# Patient Record
Sex: FEMALE | Race: WHITE | NOT HISPANIC OR LATINO | Employment: FULL TIME | URBAN - METROPOLITAN AREA
[De-identification: names, ages, dates, MRNs, and addresses within clinical notes are randomized per-mention and may not be internally consistent; named-entity substitution may affect disease eponyms.]

---

## 2017-03-01 ENCOUNTER — OFFICE VISIT - HEALTHEAST (OUTPATIENT)
Dept: FAMILY MEDICINE | Facility: CLINIC | Age: 47
End: 2017-03-01

## 2017-03-01 DIAGNOSIS — Z00.00 PREVENTATIVE HEALTH CARE: ICD-10-CM

## 2017-03-01 DIAGNOSIS — R10.9 RIGHT SIDED ABDOMINAL PAIN: ICD-10-CM

## 2017-03-01 DIAGNOSIS — Z13.29 SCREENING FOR THYROID DISORDER: ICD-10-CM

## 2017-03-01 DIAGNOSIS — F32.81 PMDD (PREMENSTRUAL DYSPHORIC DISORDER): ICD-10-CM

## 2017-03-01 DIAGNOSIS — Z13.0 SCREENING, ANEMIA, DEFICIENCY, IRON: ICD-10-CM

## 2017-03-01 DIAGNOSIS — Z13.21 SCREENING FOR ENDOCRINE, NUTRITIONAL, METABOLIC AND IMMUNITY DISORDER: ICD-10-CM

## 2017-03-01 DIAGNOSIS — G89.29 CHRONIC PAIN OF RIGHT KNEE: ICD-10-CM

## 2017-03-01 DIAGNOSIS — Z13.228 SCREENING FOR ENDOCRINE, NUTRITIONAL, METABOLIC AND IMMUNITY DISORDER: ICD-10-CM

## 2017-03-01 DIAGNOSIS — Z13.0 SCREENING FOR ENDOCRINE, NUTRITIONAL, METABOLIC AND IMMUNITY DISORDER: ICD-10-CM

## 2017-03-01 DIAGNOSIS — M25.561 CHRONIC PAIN OF RIGHT KNEE: ICD-10-CM

## 2017-03-01 DIAGNOSIS — Z13.228 SCREENING FOR METABOLIC DISORDER: ICD-10-CM

## 2017-03-01 DIAGNOSIS — Z00.00 ROUTINE GENERAL MEDICAL EXAMINATION AT A HEALTH CARE FACILITY: ICD-10-CM

## 2017-03-01 DIAGNOSIS — Z13.1 SCREENING FOR DIABETES MELLITUS: ICD-10-CM

## 2017-03-01 DIAGNOSIS — R06.02 SOB (SHORTNESS OF BREATH): ICD-10-CM

## 2017-03-01 DIAGNOSIS — N39.41 URGE INCONTINENCE: ICD-10-CM

## 2017-03-01 DIAGNOSIS — Z13.29 SCREENING FOR ENDOCRINE, NUTRITIONAL, METABOLIC AND IMMUNITY DISORDER: ICD-10-CM

## 2017-03-01 DIAGNOSIS — Z13.220 SCREENING, LIPID: ICD-10-CM

## 2017-03-01 LAB
CHOLEST SERPL-MCNC: 182 MG/DL
FASTING STATUS PATIENT QL REPORTED: YES
HBA1C MFR BLD: 5.5 % (ref 3.5–6)
HDLC SERPL-MCNC: 41 MG/DL
LDLC SERPL CALC-MCNC: 121 MG/DL
TRIGL SERPL-MCNC: 101 MG/DL

## 2017-03-01 ASSESSMENT — MIFFLIN-ST. JEOR: SCORE: 2040.84

## 2017-03-01 NOTE — ASSESSMENT & PLAN NOTE
Currently resolved, but noted two weeks ago - she is worried it was her gallbladder, will get LFTs.

## 2017-03-01 NOTE — ASSESSMENT & PLAN NOTE
Weight reduction recommended.  She  is participating in the Weight Loss Program at [facility].

## 2017-03-01 NOTE — ASSESSMENT & PLAN NOTE
Pap: 7/30/2012. Results were: normal hpv neg.  Mammo: November 2016. Results were: normal  Colonoscopy:  There is no family or personal history, not indicated    Std testing desired:  offered  Osteoporosis prevention discussed.  vitamin d levels ordered. Recommend daily calcium and vitamin d intake to keep good bone health. Recommend weight bearing exercise, no tobacco, and limit alcohol  dexa - recommend after menopause.   Recommend sunscreen, exercise, & healthy diet.  Offered tsh, glucose, hgb, lipid  I have had an Advance Directives discussion with the patient.   Body mass index is 53.57 kg/(m^2).   mychart offered.

## 2017-03-01 NOTE — ASSESSMENT & PLAN NOTE
She also has chronic shortness of breath that she associates with her morbid obesity which is stable and not worsening.   Body mass index is 53.57 kg/(m^2).

## 2017-03-01 NOTE — ASSESSMENT & PLAN NOTE
Weight reduction recommended.  She  is participating in the Weight Loss Program at [facility].

## 2017-03-02 ENCOUNTER — COMMUNICATION - HEALTHEAST (OUTPATIENT)
Dept: FAMILY MEDICINE | Facility: CLINIC | Age: 47
End: 2017-03-02

## 2017-03-02 DIAGNOSIS — D64.9 BORDERLINE ANEMIA: ICD-10-CM

## 2017-03-02 DIAGNOSIS — E55.9 VITAMIN D DEFICIENCY DISEASE: ICD-10-CM

## 2017-03-21 ENCOUNTER — COMMUNICATION - HEALTHEAST (OUTPATIENT)
Dept: SCHEDULING | Facility: CLINIC | Age: 47
End: 2017-03-21

## 2017-03-22 ENCOUNTER — OFFICE VISIT - HEALTHEAST (OUTPATIENT)
Dept: FAMILY MEDICINE | Facility: CLINIC | Age: 47
End: 2017-03-22

## 2017-03-22 DIAGNOSIS — N39.0 UTI (URINARY TRACT INFECTION): ICD-10-CM

## 2017-03-22 DIAGNOSIS — R10.9 ABDOMINAL PAIN: ICD-10-CM

## 2017-03-22 RX ORDER — MULTIVITAMIN
1 CAPSULE ORAL DAILY
Status: SHIPPED | COMMUNITY
Start: 2012-07-30

## 2017-03-26 ENCOUNTER — COMMUNICATION - HEALTHEAST (OUTPATIENT)
Dept: FAMILY MEDICINE | Facility: CLINIC | Age: 47
End: 2017-03-26

## 2021-05-29 ENCOUNTER — RECORDS - HEALTHEAST (OUTPATIENT)
Dept: ADMINISTRATIVE | Facility: CLINIC | Age: 51
End: 2021-05-29

## 2021-05-30 ENCOUNTER — RECORDS - HEALTHEAST (OUTPATIENT)
Dept: ADMINISTRATIVE | Facility: CLINIC | Age: 51
End: 2021-05-30

## 2021-05-30 VITALS — BODY MASS INDEX: 54.52 KG/M2 | WEIGHT: 293 LBS

## 2021-05-30 VITALS — HEIGHT: 65 IN | BODY MASS INDEX: 48.82 KG/M2 | WEIGHT: 293 LBS

## 2021-05-31 ENCOUNTER — RECORDS - HEALTHEAST (OUTPATIENT)
Dept: ADMINISTRATIVE | Facility: CLINIC | Age: 51
End: 2021-05-31

## 2021-06-01 ENCOUNTER — RECORDS - HEALTHEAST (OUTPATIENT)
Dept: ADMINISTRATIVE | Facility: CLINIC | Age: 51
End: 2021-06-01

## 2021-06-09 NOTE — PROGRESS NOTES
Assessment:      Healthy female exam.    over 15 min spent discussing topics besides usual annual exam.     Plan:      Problem List Items Addressed This Visit     Preventative health care     Pap: 7/30/2012. Results were: normal hpv neg.  Mammo: November 2016. Results were: normal  Colonoscopy:  There is no family or personal history, not indicated    Std testing desired:  offered  Osteoporosis prevention discussed.  vitamin d levels ordered. Recommend daily calcium and vitamin d intake to keep good bone health. Recommend weight bearing exercise, no tobacco, and limit alcohol  dexa - recommend after menopause.   Recommend sunscreen, exercise, & healthy diet.  Offered tsh, glucose, hgb, lipid  I have had an Advance Directives discussion with the patient.   Body mass index is 53.57 kg/(m^2).   mychart offered.           Right knee pain     Weight reduction recommended.  She  is participating in the Weight Loss Program at [facility].           Urge incontinence     Weight reduction recommended.  She  is participating in the Weight Loss Program at [facility].           PMDD (premenstrual dysphoric disorder)     Will try sarafem. Recheck in 2-4 months to ensure working well.          Relevant Medications    FLUoxetine (PROZAC) 10 MG capsule    Right sided abdominal pain     Currently resolved, but noted two weeks ago - she is worried it was her gallbladder, will get LFTs.           SOB (shortness of breath)     She also has chronic shortness of breath that she associates with her morbid obesity which is stable and not worsening.   Body mass index is 53.57 kg/(m^2).            Other Visit Diagnoses     Routine general medical examination at a health care facility    -  Primary    Screening for thyroid disorder        Relevant Orders    Thyroid [place]    Screening for metabolic disorder        Relevant Orders    Comprehensive Metabolic Panel    Screening, lipid        Relevant Orders    Lipid Cascade     Screening for diabetes mellitus        Relevant Orders    Glycosylated Hemoglobin A1c    Screening, anemia, deficiency, iron        Relevant Orders    HM1(CBC and Differential)    HM1 (CBC with Diff)    Screening for endocrine, nutritional, metabolic and immunity disorder        Relevant Orders    Vitamin D, Total (25-Hydroxy)           I have had an Advance Directives discussion with the patient.     The following are part of a depression follow up plan for the patient:  implementation of measures to provide psychological support      Subjective:      Khalida Lei is a 46 y.o. female who presents for an annual exam. The patient is sexually active. The patient participates in regular exercise: no. The patient reports that there is not domestic violence in her life.     Khalida Lei is a 46 y.o. female is interested in the weight loss program. She is not interested in bariatric surgery.     She wants to mention some right side abdominal pain that she had two weeks ago that seems to be totally gone now.  She is worried about her gallbladder.     She also complains of chronic right knee pain.  She cannot exercise because the pain is too bothersome.     She also has chronic shortness of breath that she associates with her morbid obesity which is stable and not worsening.     Healthy Habits:   Regular Exercise: No  Sunscreen Use: Yes  Healthy Diet: Yes  Dental Visits Regularly: Yes  Seat Belt: Yes  Sexually active: Yes  Self Breast Exam Monthly:Yes  Hemoccults: No  Flex Sig: No  Colonoscopy: No  Lipid Profile: Yes  Glucose Screen: Yes  Prevention of Osteoporosis: Yes  Last Dexa: No  Guns at Home:  No      Immunization History   Administered Date(s) Administered     DT (pediatric) 01/01/1997     Influenza L1b3-89, 09/01/2009     Influenza, inj, historic 09/01/2009     Td, historic 04/07/2007     Tdap 05/04/2007     Immunization status: up to date and documented.    No exam data present    Gynecologic  "History  Patient's last menstrual period was 2017 (exact date).  Contraception: none  Last Pap: 2012. Results were: normal hpv neg.  Last mammogram: 2016. Results were: normal      OB History    Para Term  AB SAB TAB Ectopic Multiple Living   2 2 2   0    2      # Outcome Date GA Lbr Brian/2nd Weight Sex Delivery Anes PTL Lv   2 Term 06    F       1 Term 02    M              No current outpatient prescriptions on file.     No current facility-administered medications for this visit.      No past medical history on file.  No past surgical history on file.  Codeine and Scallops  No family history on file.  Social History     Social History     Marital status:      Spouse name: N/A     Number of children: N/A     Years of education: N/A     Occupational History     Not on file.     Social History Main Topics     Smoking status: Never Smoker     Smokeless tobacco: Never Used     Alcohol use Yes      Comment: Occasionally     Drug use: No     Sexual activity: Yes     Partners: Male     Other Topics Concern     Not on file     Social History Narrative     No narrative on file       Review of Systems see hpi.  Review of Systems   Constitutional: Negative.    HENT: Negative.    Eyes: Negative.    Respiratory: Negative.    Cardiovascular: Negative.    Gastrointestinal: Negative.    Endocrine: Negative.    Genitourinary: Negative.    Musculoskeletal: Negative.    Skin: Negative.    Neurological: Negative.    Hematological: Negative.    Psychiatric/Behavioral: Negative.              Objective:         Vitals:    17 0957   BP: 126/82   Pulse: 76   Resp: 16   Weight: (!) 317 lb (143.8 kg)   Height: 5' 4.5\" (1.638 m)     Body mass index is 53.57 kg/(m^2).    Physical   Physical Exam   Constitutional: She appears well-developed and well-nourished.   HENT:   Right Ear: External ear normal.   Left Ear: External ear normal.   Nose: Nose normal.   Mouth/Throat: Oropharynx is " clear and moist.   Eyes: Conjunctivae and EOM are normal. Pupils are equal, round, and reactive to light. Right eye exhibits no discharge. Left eye exhibits no discharge.   Neck: No thyromegaly present.   Cardiovascular: Normal rate, regular rhythm and normal heart sounds.    No murmur heard.  Pulmonary/Chest: Effort normal and breath sounds normal. Right breast exhibits no inverted nipple, no mass, no nipple discharge, no skin change and no tenderness. Left breast exhibits no inverted nipple, no mass, no nipple discharge, no skin change and no tenderness. Breasts are symmetrical.   Abdominal: Soft. Bowel sounds are normal. She exhibits no distension and no mass. There is no tenderness. There is no rebound and no guarding.   Genitourinary:   Genitourinary Comments: Declined pap up to date.   Musculoskeletal: Normal range of motion.   No joint swelling or deformity.   Lymphadenopathy:     She has no cervical adenopathy.   Neurological: She is alert. She has normal reflexes.   Skin: Skin is warm and dry. No rash noted.   Psychiatric: She has a normal mood and affect.   Little interest or pleasure in doing things: Not at all  Feeling down, depressed, or hopeless: Not at all  Trouble falling or staying asleep, or sleeping too much: Several days  Feeling tired or having little energy: Several days  Poor appetite or overeating: Not at all  Feeling bad about yourself - or that you are a failure or have let yourself or your family down: Not at all  Trouble concentrating on things, such as reading the newspaper or watching television: Not at all  Moving or speaking so slowly that other people could have noticed. Or the opposite - being so fidgety or restless that you have been moving around a lot more than usual: Not at all  Thoughts that you would be better off dead, or of hurting yourself in some way: Not at all  PHQ-9 Total Score: 2

## 2021-06-15 PROBLEM — R10.9 RIGHT SIDED ABDOMINAL PAIN: Status: ACTIVE | Noted: 2017-03-01

## 2021-06-15 PROBLEM — N39.41 URGE INCONTINENCE: Status: ACTIVE | Noted: 2017-03-01

## 2021-06-15 PROBLEM — D64.9 BORDERLINE ANEMIA: Status: ACTIVE | Noted: 2017-03-02

## 2021-06-15 PROBLEM — R06.02 SOB (SHORTNESS OF BREATH): Status: ACTIVE | Noted: 2017-03-01

## 2021-06-15 PROBLEM — E55.9 VITAMIN D DEFICIENCY DISEASE: Status: ACTIVE | Noted: 2017-03-02

## 2021-06-15 PROBLEM — F32.81 PMDD (PREMENSTRUAL DYSPHORIC DISORDER): Status: ACTIVE | Noted: 2017-03-01

## 2021-06-15 PROBLEM — M25.561 RIGHT KNEE PAIN: Status: ACTIVE | Noted: 2017-03-01

## 2021-06-15 PROBLEM — Z00.00 PREVENTATIVE HEALTH CARE: Status: ACTIVE | Noted: 2017-03-01

## 2021-06-17 ENCOUNTER — RECORDS - HEALTHEAST (OUTPATIENT)
Dept: ADMINISTRATIVE | Facility: OTHER | Age: 51
End: 2021-06-17

## 2021-06-25 NOTE — PROGRESS NOTES
Progress Notes by Dale Banks DO at 3/22/2017  2:50 PM     Author: Dale Banks DO Service: -- Author Type: Physician    Filed: 3/23/2017 11:44 AM Encounter Date: 3/22/2017 Status: Signed    : Dale Banks DO (Physician)       Chief Complaint   Patient presents with   ? Abdominal Pain     cramping, diarrhea. started yesterday pm. Seen by PCP when she didn't have Sx, so returned now with Sx. Spoke with triage.         History of Present Illness: Nursing notes reviewed. About 1 month ago, she had pain across her lower abdomen, worse on right side lasting about 9 days being on/off during that time. The pain would wake her up at night. She has had her appendix removed. She would have intermittent diarrhea during this time. She passed a mucous during one bowel movement. The symptoms recurred two nights ago, lasting about 2 hours. She had some chills yesterday. She had one episode of diarrhea yesterday morning.    Review of systems: See history of present illness, otherwise negative.     Current Outpatient Prescriptions   Medication Sig Dispense Refill   ? multivitamin capsule Take 1 capsule by mouth.     ? ciprofloxacin HCl (CIPRO) 500 MG tablet Take 1 tablet (500 mg total) by mouth 2 (two) times a day for 7 days. 14 tablet 0     No current facility-administered medications for this visit.        No past medical history on file.   No past surgical history on file.   Social History     Social History   ? Marital status:      Spouse name: N/A   ? Number of children: N/A   ? Years of education: N/A     Social History Main Topics   ? Smoking status: Never Smoker   ? Smokeless tobacco: Never Used   ? Alcohol use Yes      Comment: Occasionally   ? Drug use: No   ? Sexual activity: Yes     Partners: Male     Other Topics Concern   ? None     Social History Narrative       History   Smoking Status   ? Never Smoker   Smokeless Tobacco   ? Never Used      Exam:   Blood pressure 136/80, pulse 77, temperature  98.2  F (36.8  C), temperature source Oral, resp. rate 20, weight (!) 322 lb 9.6 oz (146.3 kg), last menstrual period 02/20/2017, SpO2 97 %, not currently breastfeeding.    EXAM:   General: Vital signs reviewed. Patient is in no acute appearing distress with no guarding of movement during exam. Breathing is non labored appearing. Patient is alert and oriented x 3.   Heart: Normal rate and rhythm without murmur  Lungs: Clear to auscultation with good air flow bilaterally.  Back: non tender  Abdomen soft, tender throughout the lower abdomen, more over the RLQ, no abnormal masses. Normal bowel sounds. No abdominal pain with right heel taping.  Skin: warm and dry    Recent Results (from the past 24 hour(s))   Comprehensive Metabolic Panel   Result Value Ref Range    Sodium 141 136 - 145 mmol/L    Potassium 3.8 3.5 - 5.0 mmol/L    Chloride 105 98 - 107 mmol/L    CO2 24 22 - 31 mmol/L    Anion Gap, Calculation 12 5 - 18 mmol/L    Glucose 109 70 - 125 mg/dL    BUN 15 8 - 22 mg/dL    Creatinine 0.87 0.60 - 1.10 mg/dL    GFR MDRD Af Amer >60 >60 mL/min/1.73m2    GFR MDRD Non Af Amer >60 >60 mL/min/1.73m2    Bilirubin, Total 0.3 0.0 - 1.0 mg/dL    Calcium 8.8 8.5 - 10.5 mg/dL    Protein, Total 7.6 6.0 - 8.0 g/dL    Albumin 3.2 (L) 3.5 - 5.0 g/dL    Alkaline Phosphatase 70 45 - 120 U/L    AST 20 0 - 40 U/L    ALT 27 0 - 45 U/L   Urinalysis   Result Value Ref Range    Color, UA Yellow Colorless, Yellow, Straw, Light Yellow    Clarity, UA Clear Clear    Glucose, UA Negative Negative    Bilirubin, UA Negative Negative    Ketones, UA Negative Negative    Specific Gravity, UA >=1.030 1.005 - 1.030    Blood, UA Negative Negative    pH, UA 5.5 5.0 - 8.0    Protein,  mg/dL (!) Negative mg/dL    Urobilinogen, UA 0.2 E.U./dL 0.2 E.U./dL, 1.0 E.U./dL    Nitrite, UA Negative Negative    Leukocytes, UA Small (!) Negative    Bacteria, UA Moderate (!) None Seen hpf    RBC, UA 0-2 None Seen, 0-2 hpf    WBC, UA 10-25 (!) None Seen, 0-5  hpf    Squam Epithel, UA  (!) None Seen, 0-5 lpf    Mucus, UA Moderate (!) None Seen lpf   HM1 (CBC with Diff)   Result Value Ref Range    WBC 11.6 (H) 4.0 - 11.0 thou/uL    RBC 4.60 3.80 - 5.40 mill/uL    Hemoglobin 11.5 (L) 12.0 - 16.0 g/dL    Hematocrit 35.4 35.0 - 47.0 %    MCV 77 (L) 80 - 100 fL    MCH 24.9 (L) 27.0 - 34.0 pg    MCHC 32.4 32.0 - 36.0 g/dL    RDW 15.0 (H) 11.0 - 14.5 %    Platelets 218 140 - 440 thou/uL    MPV 7.7 7.0 - 10.0 fL    Neutrophils % 70 50 - 70 %    Lymphocytes % 21 20 - 40 %    Monocytes % 6 2 - 10 %    Eosinophils % 3 0 - 6 %    Basophils % 1 0 - 2 %    Neutrophils Absolute 8.1 (H) 2.0 - 7.7 thou/uL    Lymphocytes Absolute 2.4 0.8 - 4.4 thou/uL    Monocytes Absolute 0.7 0.0 - 0.9 thou/uL    Eosinophils Absolute 0.4 0.0 - 0.4 thou/uL    Basophils Absolute 0.1 0.0 - 0.2 thou/uL   CBC and UA results from exam reviewed with patient during clinic visit.    Assessment/Plan   1. Abdominal pain  HM1(CBC and Differential)    Comprehensive Metabolic Panel    Urinalysis    HM1 (CBC with Diff)   2. UTI (urinary tract infection)  ciprofloxacin HCl (CIPRO) 500 MG tablet       Patient Instructions     Be seen again in 2 days if symptoms are not better, sooner if feeling any worse. Based on exam, your symptoms are still suggestive of diverticulitis. It is possible you could have both a urine infection, and diverticultis. We will notify you of the urine culture results when available. If you are feeling better, finish the antibiotic treatment even if the culture is negative. We will let you know the results of the pending blood tests when they are finished.    Urinary Tract Infections in Women  Urinary tract infections (UTIs) are most often caused by bacteria (germs). These bacteria enter the urinary tract. The bacteria may come from outside the body. Or they may travel from the skin outside the rectum or vagina into the urethra. Female anatomy makes it easier for bacteria from the bowel to  enter a womans urinary tract, which is the most common source of UTI. This means women develop UTIs more often than men. Pain in or around the urinary tract is a common UTI symptom. But the only way to know for sure if you have a UTI for the health care provider to test your urine. The two tests that may be done are the urinalysis and urine culture.  Types of UTIs    Cystitis: A bladder infection (cystitis) is the most common UTI in women. You may have urgent or frequent urination. You may also have pain, burning when you urinate, and bloody urine.    Urethritis: This is an inflamed urethra, which is the tube that carries urine from the bladder to outside the body. You may have lower stomach or back pain. You may also have urgent or frequent urination.    Pyelonephritis: This is a kidney infection. If not treated, it can be serious and damage your kidneys. In severe cases, you may be hospitalized. You may have a fever and lower back pain.  Medications to treat a UTI  Most UTIs are treated with antibiotics. These kill the bacteria. The length of time you need to take them depends on the type of infection. It may be as short as 3 days. If you have repeated UTIs, a low-dose antibiotic may be needed for several months. Take antibiotics exactly as directed. Dont stop taking them until all of the medication is gone. If you stop taking the antibiotic too soon, the infection may not go away, and you may develop a resistance to the antibiotic. This can make it much harder to treat.  Lifestyle changes to treat and prevent UTIs  The lifestyle changes below will help get rid of your UTI. They may also help prevent future UTIs.    Drink plenty of fluids. This includes water, juice, or other caffeine-free drinks. Fluids help flush bacteria out of your body.    Empty your bladder. Always empty your bladder when you feel the urge to urinate. And always urinate before going to sleep. Urine that stays in your bladder can lead to  infection. Try to urinate before and after sex as well.    Practice good personal hygiene. Wipe yourself from front to back after using the toilet. This helps keep bacteria from getting into the urethra.    Use condoms during sex. These help prevent UTIs caused by sexually transmitted bacteria. Also, avoid using spermicides during sex. These can increase the risk of UTIs. Choose other forms of birth control instead. For women who tend to get UTIs after sex, a low-dose of a preventive antibiotic may be used. Be sure to discuss this option with your health care provider.    Follow up with your health care provider as directed. He or she may test to make sure the infection has cleared. If necessary, additional treatment may be started.    4898-4574 [employer]. [address] [place] [address], [place] [address]. All rights reserved. This information is not intended as a substitute for professional medical care. Always follow your healthcare professional's instructions.          Understanding Diverticulosis and Diverticulitis              The colon (large intestine) is the last part of the digestive tract. It absorbs water from stool and changes it from a liquid to a solid. In certain cases, small pouches called diverticula can form in the colon wall. This condition is called diverticulosis. The pouches can become infected. If this happens, it becomes a more serious problem called diverticulitis. These problems can be painful. But they can be managed.  Managing Your Condition  Diet changes or taking medications are often tried first. These may be enough to bring relief. If the case is bad, surgery may be done. You and your doctor can discuss the plan that is best for you.  If You Have Diverticulosis    Diet changes are often enough to control symptoms. The main changes are adding fiber (roughage) and drinking more water. Fiber absorbs water as it travels through your colon. This helps your stool stay soft and move  smoothly. Water helps this process.    If needed, you may be told to take over-the-counter stool softeners.    To help relieve pain, antispasmodic medications may be prescribed.    Watch for changes in your bowel movements. Tell the doctor if you notice any changes.    Begin an exercise program. Ask your doctor how to get started.    Get plenty of rest and sleep.   If You Have Diverticulitis  Treatment depends on how bad your symptoms are.    For mild symptoms. You may be put on a liquid diet for a short time. Antibiotics are usually prescribed. If these 2 steps relieve your symptoms, you may then be prescribed a high-fiber diet. If you still have symptoms, your doctor will discuss further treatment options with you.    For severe symptoms. You may need to be admitted to the hospital. There, you can be given IV antibiotics and fluids. Once symptoms are under control, the above treatments may be tried. If these dont control your condition, your doctor may discuss the option of having surgery with you.  [place] [facility]  Help keep your colon healthy with a diet that includes plenty of high-fiber fruits, vegetables, and whole grains. Drink plenty of liquids like water and juice.     [id number] [employer]. [address] [place] [address], [place] [address]. All rights reserved. This information is not intended as a substitute for professional medical care. Always follow your healthcare professional's instructions.           Dale Banks,

## 2021-07-04 DIAGNOSIS — Z11.59 ENCOUNTER FOR SCREENING FOR OTHER VIRAL DISEASES: ICD-10-CM

## 2021-07-13 ENCOUNTER — RECORDS - HEALTHEAST (OUTPATIENT)
Dept: ADMINISTRATIVE | Facility: CLINIC | Age: 51
End: 2021-07-13

## 2021-07-21 ENCOUNTER — RECORDS - HEALTHEAST (OUTPATIENT)
Dept: ADMINISTRATIVE | Facility: CLINIC | Age: 51
End: 2021-07-21

## 2021-09-17 ENCOUNTER — LAB (OUTPATIENT)
Dept: LAB | Facility: CLINIC | Age: 51
End: 2021-09-17
Attending: INTERNAL MEDICINE
Payer: COMMERCIAL

## 2021-09-17 DIAGNOSIS — Z11.59 ENCOUNTER FOR SCREENING FOR OTHER VIRAL DISEASES: ICD-10-CM

## 2021-09-17 PROCEDURE — U0005 INFEC AGEN DETEC AMPLI PROBE: HCPCS

## 2021-09-17 PROCEDURE — U0003 INFECTIOUS AGENT DETECTION BY NUCLEIC ACID (DNA OR RNA); SEVERE ACUTE RESPIRATORY SYNDROME CORONAVIRUS 2 (SARS-COV-2) (CORONAVIRUS DISEASE [COVID-19]), AMPLIFIED PROBE TECHNIQUE, MAKING USE OF HIGH THROUGHPUT TECHNOLOGIES AS DESCRIBED BY CMS-2020-01-R: HCPCS

## 2021-09-18 LAB — SARS-COV-2 RNA RESP QL NAA+PROBE: NEGATIVE

## 2021-09-20 ENCOUNTER — HOSPITAL ENCOUNTER (OUTPATIENT)
Facility: CLINIC | Age: 51
Discharge: HOME | End: 2021-09-20
Attending: INTERNAL MEDICINE | Admitting: INTERNAL MEDICINE
Payer: COMMERCIAL

## 2021-09-20 ENCOUNTER — ANESTHESIA (OUTPATIENT)
Dept: SURGERY | Facility: CLINIC | Age: 51
End: 2021-09-20
Payer: COMMERCIAL

## 2021-09-20 ENCOUNTER — ANESTHESIA EVENT (OUTPATIENT)
Dept: SURGERY | Facility: CLINIC | Age: 51
End: 2021-09-20
Payer: COMMERCIAL

## 2021-09-20 VITALS
TEMPERATURE: 97.7 F | HEIGHT: 64 IN | DIASTOLIC BLOOD PRESSURE: 64 MMHG | WEIGHT: 293 LBS | SYSTOLIC BLOOD PRESSURE: 113 MMHG | OXYGEN SATURATION: 98 % | BODY MASS INDEX: 50.02 KG/M2 | HEART RATE: 68 BPM | RESPIRATION RATE: 18 BRPM

## 2021-09-20 LAB — COLONOSCOPY: NORMAL

## 2021-09-20 PROCEDURE — 360N000075 HC SURGERY LEVEL 2, PER MIN: Performed by: INTERNAL MEDICINE

## 2021-09-20 PROCEDURE — 250N000011 HC RX IP 250 OP 636: Performed by: ANESTHESIOLOGY

## 2021-09-20 PROCEDURE — 258N000003 HC RX IP 258 OP 636: Performed by: ANESTHESIOLOGY

## 2021-09-20 PROCEDURE — 272N000001 HC OR GENERAL SUPPLY STERILE: Performed by: INTERNAL MEDICINE

## 2021-09-20 PROCEDURE — 710N000012 HC RECOVERY PHASE 2, PER MINUTE: Performed by: INTERNAL MEDICINE

## 2021-09-20 PROCEDURE — 88305 TISSUE EXAM BY PATHOLOGIST: CPT | Mod: TC | Performed by: INTERNAL MEDICINE

## 2021-09-20 PROCEDURE — 370N000017 HC ANESTHESIA TECHNICAL FEE, PER MIN: Performed by: INTERNAL MEDICINE

## 2021-09-20 PROCEDURE — 250N000009 HC RX 250: Performed by: ANESTHESIOLOGY

## 2021-09-20 PROCEDURE — 250N000011 HC RX IP 250 OP 636: Performed by: NURSE ANESTHETIST, CERTIFIED REGISTERED

## 2021-09-20 PROCEDURE — 999N000141 HC STATISTIC PRE-PROCEDURE NURSING ASSESSMENT: Performed by: INTERNAL MEDICINE

## 2021-09-20 RX ORDER — ONDANSETRON 4 MG/1
4 TABLET, ORALLY DISINTEGRATING ORAL EVERY 6 HOURS PRN
Status: DISCONTINUED | OUTPATIENT
Start: 2021-09-20 | End: 2021-09-20 | Stop reason: HOSPADM

## 2021-09-20 RX ORDER — SODIUM CHLORIDE, SODIUM LACTATE, POTASSIUM CHLORIDE, CALCIUM CHLORIDE 600; 310; 30; 20 MG/100ML; MG/100ML; MG/100ML; MG/100ML
INJECTION, SOLUTION INTRAVENOUS CONTINUOUS
Status: DISCONTINUED | OUTPATIENT
Start: 2021-09-20 | End: 2021-09-20 | Stop reason: HOSPADM

## 2021-09-20 RX ORDER — PROPOFOL 10 MG/ML
INJECTION, EMULSION INTRAVENOUS CONTINUOUS PRN
Status: DISCONTINUED | OUTPATIENT
Start: 2021-09-20 | End: 2021-09-20

## 2021-09-20 RX ORDER — NALOXONE HYDROCHLORIDE 0.4 MG/ML
0.2 INJECTION, SOLUTION INTRAMUSCULAR; INTRAVENOUS; SUBCUTANEOUS
Status: DISCONTINUED | OUTPATIENT
Start: 2021-09-20 | End: 2021-09-20 | Stop reason: HOSPADM

## 2021-09-20 RX ORDER — NALOXONE HYDROCHLORIDE 0.4 MG/ML
0.4 INJECTION, SOLUTION INTRAMUSCULAR; INTRAVENOUS; SUBCUTANEOUS
Status: DISCONTINUED | OUTPATIENT
Start: 2021-09-20 | End: 2021-09-20 | Stop reason: HOSPADM

## 2021-09-20 RX ORDER — FLUMAZENIL 0.1 MG/ML
0.2 INJECTION, SOLUTION INTRAVENOUS
Status: DISCONTINUED | OUTPATIENT
Start: 2021-09-20 | End: 2021-09-20 | Stop reason: HOSPADM

## 2021-09-20 RX ORDER — ONDANSETRON 2 MG/ML
4 INJECTION INTRAMUSCULAR; INTRAVENOUS EVERY 30 MIN PRN
Status: DISCONTINUED | OUTPATIENT
Start: 2021-09-20 | End: 2021-09-20 | Stop reason: HOSPADM

## 2021-09-20 RX ORDER — ALBUTEROL SULFATE 0.83 MG/ML
2.5 SOLUTION RESPIRATORY (INHALATION) EVERY 4 HOURS PRN
Status: CANCELLED | OUTPATIENT
Start: 2021-09-20

## 2021-09-20 RX ORDER — HALOPERIDOL 5 MG/ML
1 INJECTION INTRAMUSCULAR
Status: DISCONTINUED | OUTPATIENT
Start: 2021-09-20 | End: 2021-09-20 | Stop reason: HOSPADM

## 2021-09-20 RX ORDER — HYDROMORPHONE HCL IN WATER/PF 6 MG/30 ML
0.2 PATIENT CONTROLLED ANALGESIA SYRINGE INTRAVENOUS EVERY 5 MIN PRN
Status: CANCELLED | OUTPATIENT
Start: 2021-09-20

## 2021-09-20 RX ORDER — ONDANSETRON 2 MG/ML
4 INJECTION INTRAMUSCULAR; INTRAVENOUS
Status: DISCONTINUED | OUTPATIENT
Start: 2021-09-20 | End: 2021-09-20 | Stop reason: HOSPADM

## 2021-09-20 RX ORDER — PROCHLORPERAZINE MALEATE 10 MG
10 TABLET ORAL EVERY 6 HOURS PRN
Status: DISCONTINUED | OUTPATIENT
Start: 2021-09-20 | End: 2021-09-20 | Stop reason: HOSPADM

## 2021-09-20 RX ORDER — LIDOCAINE 40 MG/G
CREAM TOPICAL
Status: DISCONTINUED | OUTPATIENT
Start: 2021-09-20 | End: 2021-09-20 | Stop reason: HOSPADM

## 2021-09-20 RX ORDER — FENTANYL CITRATE 50 UG/ML
50 INJECTION, SOLUTION INTRAMUSCULAR; INTRAVENOUS EVERY 5 MIN PRN
Status: CANCELLED | OUTPATIENT
Start: 2021-09-20 | End: 2021-09-20

## 2021-09-20 RX ORDER — ONDANSETRON 4 MG/1
4 TABLET, ORALLY DISINTEGRATING ORAL EVERY 30 MIN PRN
Status: DISCONTINUED | OUTPATIENT
Start: 2021-09-20 | End: 2021-09-20 | Stop reason: HOSPADM

## 2021-09-20 RX ORDER — MEPERIDINE HYDROCHLORIDE 25 MG/ML
12.5 INJECTION INTRAMUSCULAR; INTRAVENOUS; SUBCUTANEOUS
Status: DISCONTINUED | OUTPATIENT
Start: 2021-09-20 | End: 2021-09-20 | Stop reason: HOSPADM

## 2021-09-20 RX ORDER — ONDANSETRON 2 MG/ML
4 INJECTION INTRAMUSCULAR; INTRAVENOUS EVERY 6 HOURS PRN
Status: DISCONTINUED | OUTPATIENT
Start: 2021-09-20 | End: 2021-09-20 | Stop reason: HOSPADM

## 2021-09-20 RX ADMIN — ONDANSETRON 4 MG: 2 INJECTION INTRAMUSCULAR; INTRAVENOUS at 09:14

## 2021-09-20 RX ADMIN — SODIUM CHLORIDE, POTASSIUM CHLORIDE, SODIUM LACTATE AND CALCIUM CHLORIDE: 600; 310; 30; 20 INJECTION, SOLUTION INTRAVENOUS at 09:05

## 2021-09-20 RX ADMIN — PROPOFOL 200 MCG/KG/MIN: 10 INJECTION, EMULSION INTRAVENOUS at 09:09

## 2021-09-20 RX ADMIN — LIDOCAINE HYDROCHLORIDE 30 ML: 10 INJECTION, SOLUTION INFILTRATION; PERINEURAL at 09:09

## 2021-09-20 ASSESSMENT — MIFFLIN-ST. JEOR: SCORE: 1928.11

## 2021-09-20 NOTE — ANESTHESIA POSTPROCEDURE EVALUATION
Patient: Khalida Lei    Procedure(s):  COLONOSCOPY WITH POLYPS    Diagnosis:Colon cancer screening [Z12.11]  Diagnosis Additional Information: No value filed.    Anesthesia Type:  MAC    Note:  Disposition: Outpatient   Postop Pain Control: Uneventful            Sign Out: Well controlled pain   PONV: No   Neuro/Psych: Uneventful            Sign Out: Acceptable/Baseline neuro status   Airway/Respiratory: Uneventful            Sign Out: Acceptable/Baseline resp. status   CV/Hemodynamics: Uneventful            Sign Out: Acceptable CV status; No obvious hypovolemia; No obvious fluid overload   Other NRE: NONE   DID A NON-ROUTINE EVENT OCCUR?            Last vitals:  Vitals Value Taken Time   /64 09/20/21 1000   Temp 36.5  C (97.7  F) 09/20/21 0943   Pulse 68 09/20/21 1000   Resp 18 09/20/21 1000   SpO2 98 % 09/20/21 1000       Electronically Signed By: ARACELI HUBBARD MD  September 20, 2021  1:03 PM

## 2021-09-20 NOTE — ANESTHESIA CARE TRANSFER NOTE
Patient: Khalida Lei    Procedure(s):  COLONOSCOPY WITH POLYPS    Diagnosis: Colon cancer screening [Z12.11]  Diagnosis Additional Information: No value filed.    Anesthesia Type:   MAC     Note:    Oropharynx: oropharynx clear of all foreign objects and spontaneously breathing  Level of Consciousness: drowsy  Oxygen Supplementation: face mask  Level of Supplemental Oxygen (L/min / FiO2): 8  Independent Airway: airway patency satisfactory and stable  Dentition: dentition unchanged  Vital Signs Stable: post-procedure vital signs reviewed and stable  Report to RN Given: handoff report given  Patient transferred to: Phase II    Handoff Report: Identifed the Patient, Identified the Reponsible Provider, Reviewed the pertinent medical history, Discussed the surgical course, Reviewed Intra-OP anesthesia mangement and issues during anesthesia, Set expectations for post-procedure period and Allowed opportunity for questions and acknowledgement of understanding      Vitals:  Vitals Value Taken Time   /64 09/20/21 0943   Temp 36.5  C (97.7  F) 09/20/21 0943   Pulse 71 09/20/21 0943   Resp 16 09/20/21 0943   SpO2 99 % 09/20/21 0943       Electronically Signed By: NEYMAR Banerjee CRNA  September 20, 2021  9:47 AM

## 2021-09-20 NOTE — PROGRESS NOTES
"PRE PROCEDURE NOTE      Chief Complaint/Reason for Procedure:              Colon screen        History and Physical Reviewed:   Reviewed no changes               Pre-sedation assessment:            /65   Temp 98  F (36.7  C) (Temporal)   Resp 16   Ht 1.626 m (5' 4\")   Wt 133.8 kg (295 lb)   SpO2 98%   BMI 50.64 kg/m    General appearance: alert, appears stated age and cooperative  Lungs: clear to auscultation bilaterally  Heart: S1, S2 normal, no murmur, click, rub or gallop, regular rate and rhythm, chest is clear without rales or wheezing, no pedal edema, no JVD, no hepatosplenomegaly  Abdomen: soft, non-tender; bowel sounds normal; no masses,  no organomegaly      Previous reaction to anesthesia/sedation:  [unfilled]      Sedation Plan based on assessment: Moderate      Comments:       ASA Classification: PIII      Impression:  Patient deemed adequate candidate for conscious sedation         Plan:   colonoscopy      Maggie Phillips MD, MD  9/20/2021 9:03 AM  [facility]                              "

## 2021-09-20 NOTE — ANESTHESIA PREPROCEDURE EVALUATION
Anesthesia Pre-Procedure Evaluation    Patient: Khalida Lei   MRN: [id number] : 1970        Preoperative Diagnosis: Colon cancer screening [Z12.11]   Procedure : Procedure(s):  COLONOSCOPY     Past Medical History:   Diagnosis Date     Motion sickness      PONV (postoperative nausea and vomiting)       History reviewed. No pertinent surgical history.   Allergies   Allergen Reactions     Codeine Unknown     Scallops [Seafood] Unknown      Social History     Tobacco Use     Smoking status: Never Smoker     Smokeless tobacco: Never Used   Substance Use Topics     Alcohol use: Not Currently     Comment: Alcoholic Drinks/day: Occasionally      Wt Readings from Last 1 Encounters:   21 133.8 kg (295 lb)        Anesthesia Evaluation   Pt has had prior anesthetic. Type: General.    History of anesthetic complications  - PONV.      ROS/MED HX  ENT/Pulmonary:  - neg pulmonary ROS     Neurologic:  - neg neurologic ROS     Cardiovascular:  - neg cardiovascular ROS     METS/Exercise Tolerance:     Hematologic:  - neg hematologic  ROS     Musculoskeletal:  - neg musculoskeletal ROS     GI/Hepatic:  - neg GI/hepatic ROS     Renal/Genitourinary:  - neg Renal ROS     Endo:     (+) Obesity,     Psychiatric/Substance Use:  - neg psychiatric ROS     Infectious Disease:  - neg infectious disease ROS     Malignancy:  - neg malignancy ROS     Other:            Physical Exam    Airway  airway exam normal      Mallampati: I   TM distance: > 3 FB   Neck ROM: full   Mouth opening: > 3 cm    Respiratory Devices and Support         Dental  no notable dental history         Cardiovascular   cardiovascular exam normal          Pulmonary   pulmonary exam normal                OUTSIDE LABS:  CBC: No results found for: WBC, HGB, HCT, PLT  BMP: No results found for: NA, POTASSIUM, CHLORIDE, CO2, BUN, CR, GLC  COAGS: No results found for: PTT, INR, FIBR  POC: No results found for: BGM, HCG, HCGS  HEPATIC: No results found for:  ALBUMIN, PROTTOTAL, ALT, AST, GGT, ALKPHOS, BILITOTAL, BILIDIRECT, SHARIF  OTHER:   Lab Results   Component Value Date    A1C 5.5 03/01/2017    TSH 3.22 06/10/2005       Anesthesia Plan    ASA Status:  3      Anesthesia Type: MAC.              Consents    Anesthesia Plan(s) and associated risks, benefits, and realistic alternatives discussed. Questions answered and patient/representative(s) expressed understanding.     - Discussed with:  Patient         Postoperative Care       PONV prophylaxis: Ondansetron (or other 5HT-3), Dexamethasone or Solumedrol     Comments:                ARACELI HUBBARD MD

## 2021-09-21 LAB
PATH REPORT.COMMENTS IMP SPEC: NORMAL
PATH REPORT.FINAL DX SPEC: NORMAL
PATH REPORT.GROSS SPEC: NORMAL
PATH REPORT.MICROSCOPIC SPEC OTHER STN: NORMAL
PATH REPORT.RELEVANT HX SPEC: NORMAL
PHOTO IMAGE: NORMAL

## 2021-09-21 PROCEDURE — 88305 TISSUE EXAM BY PATHOLOGIST: CPT | Mod: 26 | Performed by: PATHOLOGY

## 2023-06-29 ENCOUNTER — TRANSFERRED RECORDS (OUTPATIENT)
Dept: HEALTH INFORMATION MANAGEMENT | Facility: CLINIC | Age: 53
End: 2023-06-29
Payer: COMMERCIAL

## 2023-06-29 NOTE — H&P (VIEW-ONLY)
[facility]      Preoperative Consultation   Khalida Lei   : 1970   Gender: female    Date of Encounter: 2023    Nursing Notes:   EniomangoMercedes middleton  2023  6:06 PM  Signed  Chief Complaint   Patient presents with     Preoperative Exam     Occ Med     Pre Op -     Khalida Lei is a 53 y.o. female (1970) who presents for RIGHT KNEE ARTHROSCOPIC PARTIAL MEDIAL MENISCECTOMY     Date of Surgery: 23  Surgical Specialty: Orthopedic/Spine  Dr. [facility]urgical Facility: [facility]  Fax number:   Surgery type: outpatient  Primary Physician: Flakita Rodriguez      Health Maintenance Due   Topic Date Due     HIV for age 15-65  Never done     Hepatitis C screening for age 18-79  Never done     COVID-19 vaccine series (5 - Moderna series) 2022     Mammogram for age 45-75  2023     Depression screening for age 12+  2023       Health maintenance reviewed with patient Yes    Patient presents for an in-person office visit: alone  Communication Method: Patient is active on [employer] and has been instructed that results/communications will be made via [employer]  If a phone call is needed, the preferred number is:  Mobile   Home Phone [phone]   Mobile [phone]     May we leave a detailed message at this number? Yes    Mercedes Padilla ....................  2023   6:05 PM         History of Present Illness   Injured right knee while at work for [employer]. She tore her mensicus and is going to have arthroscopic surgery to repair the meniscus. Surgery date 23 at [facility].  History of SERGE-Not on Continuous Positive Airway Pressure (CPAP).     Review of Systems   A comprehensive review of systems was negative except for items noted in HPI.    Patient Active Problem List   Diagnosis Code     Morbid obesity with BMI of 50.0-59.9, adult (HC) E66.01, Z68.43     SERGE (obstructive sleep apnea) G47.33      Current Outpatient Medications   Medication Sig     calcium citrate-vitamin d 315 mg-200 unit 315 mg-5 mcg (200 unit) tablet Take 2 Tablets by mouth two times daily with meals.     cetirizine (ZYRTEC) 10 mg tablet Take 1 Tablet (10 mg) by mouth once daily if needed for Allergy Symptoms.     cholecalciferol (VITAMIN D3) 5,000 unit capsule Take 1 Capsule (5,000 units) by mouth once daily. 40 units = 1 mcg (5000  units = 125 mcg)     pedi multivit 43-iron fumarate 18 mg iron chew Chew 1 tablet. by mouth once daily.     No current facility-administered medications for this visit.     Medications have been reviewed by me and are current to the best of my knowledge and ability.     Allergies   Allergen Reactions     Codeine Vomiting     Shell Fish [Shellfish Containing Products] Hallucinations and Vomiting     Past Surgical History:   . Laterality Date     APPENDECTOMY  2002     COLPOSCOPY  2002     Social History     Tobacco Use     Smoking status: Never     Passive exposure: Never     Smokeless tobacco: Never   Vaping Use     Vaping Use: Never used   Substance Use Topics     Alcohol use: Not Currently     Comment: rare     Drug use: Never     Comment: \     Family History   Problem Relation Age of Onset     Hypertension Mother      Other Mother      Heart Disease Mother         pacemaker     Stroke Mother      Hyperlipidemia Father      Psychiatric illness Father      Stroke Father      Obesity Brother      Blood Disease Maternal Aunt         millers     Blood Disease Maternal Uncle         millers     Cancer-breast Paternal Aunt        PAST DIFFICULTY WITH ANESTHESIA: None     Physical Exam   /86 (Cuff Site: Right Forearm, Position: Sitting, Cuff Size: Adult Regular)   Pulse 74   Resp 19   Wt (!) 152.9 kg (337 lb)   SpO2 97%   BMI 56.49 kg/m   Body mass index is 56.49 kg/m .  Constitutional: Awake. Alert. Nontoxic. Answers questions appropriately.   HENT: External ears normal.  Eyes: PERRL. Conjunctiva  normal.   CV: Normal rate. Normal rhythm.   Resp: Normal effort. Lungs clear to auscultation bilaterally. No wheezes, rales, rhonchi.  MSK: moves all extremities, right knee tender medial joint line  Neuro:antalgic gait  Skin: Warm. No diaphoresis. No rashes.   Psych: Mood normal. Affect normal.     Assessment / Plan       The Pre-Op Tool    Recommendations      Low Risk Procedure    Cardiac History  No history of coronary artery disease    Sleep Apnea  History of obstructive sleep apnea, not on treatment           Labs  No routine labs indicated  EKG  Not indicated  Stress Testing  Not indicated    * Testing recommendations are intended to assist, but not direct, clinical decisions.            Labs  * Data supports elimination of  routine  laboratory testing in favor of focused,  indicated  testing based on medical co-morbidities. A 2009 study randomized 1061 patients undergoing ambulatory, non-cataract surgery to routine or to indicated testing. Perioperative adverse events were similar (Anesthesia & Analgesia 2009;108:467-75; Anesthesiol. Clin. 2016 Mar;34(1):43-58).  EKG  * The ACC/AHA recommends against obtaining routine EKGs in patients undergoing low risk surgeries, a class IIa recommendation (JACC. 2014;64(21);e1-76).     Session ID: [id number]  Endnotes and bibliography available upon request: [email or web address]  Labs:   Results for orders placed or performed in visit on 06/29/23   PREGNANCY URINE   Result Value Ref Range Status    PREGNANCY,URINE           Negative Negative Final       ECG: yes    ICD-10-CM    1. Pre-operative examination  Z01.818 PREGNANCY URINE      2. Tear of medial meniscus of right knee, current, unspecified tear type, sequela  S83.241S       3. Morbid obesity with BMI of 50.0-59.9, adult (HC)  E66.01     Z68.43       4. SERGE (obstructive sleep apnea)  G47.33         Patient is cleared for planned procedure.   Electronically Signed by:   Alka Ewing M.D.   6/29/2023  7:12  PM    6/29/2023

## 2023-06-30 RX ORDER — LANOLIN ALCOHOL/MO/W.PET/CERES
2 CREAM (GRAM) TOPICAL 2 TIMES DAILY
COMMUNITY
Start: 2022-09-27

## 2023-06-30 RX ORDER — CETIRIZINE HYDROCHLORIDE 10 MG/1
10 TABLET ORAL DAILY PRN
COMMUNITY
Start: 2023-05-25

## 2023-06-30 RX ORDER — ASPIRIN 81 MG/1
81 TABLET ORAL DAILY
COMMUNITY

## 2023-07-05 ENCOUNTER — ANESTHESIA EVENT (OUTPATIENT)
Dept: SURGERY | Facility: CLINIC | Age: 53
End: 2023-07-05
Payer: OTHER MISCELLANEOUS

## 2023-07-05 ENCOUNTER — ANESTHESIA (OUTPATIENT)
Dept: SURGERY | Facility: CLINIC | Age: 53
End: 2023-07-05
Payer: OTHER MISCELLANEOUS

## 2023-07-05 ENCOUNTER — ANCILLARY PROCEDURE (OUTPATIENT)
Dept: ULTRASOUND IMAGING | Facility: CLINIC | Age: 53
End: 2023-07-05
Attending: ANESTHESIOLOGY
Payer: COMMERCIAL

## 2023-07-05 ENCOUNTER — HOSPITAL ENCOUNTER (OUTPATIENT)
Facility: CLINIC | Age: 53
Discharge: HOME | End: 2023-07-05
Attending: ORTHOPAEDIC SURGERY | Admitting: ORTHOPAEDIC SURGERY
Payer: OTHER MISCELLANEOUS

## 2023-07-05 VITALS
DIASTOLIC BLOOD PRESSURE: 82 MMHG | OXYGEN SATURATION: 98 % | SYSTOLIC BLOOD PRESSURE: 160 MMHG | WEIGHT: 293 LBS | BODY MASS INDEX: 50.02 KG/M2 | HEART RATE: 73 BPM | RESPIRATION RATE: 12 BRPM | TEMPERATURE: 97.6 F | HEIGHT: 64 IN

## 2023-07-05 DIAGNOSIS — Z98.890 S/P ARTHROSCOPY OF RIGHT KNEE: Primary | ICD-10-CM

## 2023-07-05 PROCEDURE — 710N000010 HC RECOVERY PHASE 1, LEVEL 2, PER MIN: Performed by: ORTHOPAEDIC SURGERY

## 2023-07-05 PROCEDURE — 360N000076 HC SURGERY LEVEL 3, PER MIN: Performed by: ORTHOPAEDIC SURGERY

## 2023-07-05 PROCEDURE — 370N000017 HC ANESTHESIA TECHNICAL FEE, PER MIN: Performed by: ORTHOPAEDIC SURGERY

## 2023-07-05 PROCEDURE — 250N000011 HC RX IP 250 OP 636: Performed by: ANESTHESIOLOGY

## 2023-07-05 PROCEDURE — 258N000003 HC RX IP 258 OP 636: Performed by: ANESTHESIOLOGY

## 2023-07-05 PROCEDURE — 250N000025 HC SEVOFLURANE, PER MIN: Performed by: ORTHOPAEDIC SURGERY

## 2023-07-05 PROCEDURE — 250N000009 HC RX 250: Performed by: NURSE ANESTHETIST, CERTIFIED REGISTERED

## 2023-07-05 PROCEDURE — 710N000012 HC RECOVERY PHASE 2, PER MINUTE: Performed by: ORTHOPAEDIC SURGERY

## 2023-07-05 PROCEDURE — 250N000011 HC RX IP 250 OP 636: Performed by: PHYSICIAN ASSISTANT

## 2023-07-05 PROCEDURE — 250N000009 HC RX 250: Performed by: ORTHOPAEDIC SURGERY

## 2023-07-05 PROCEDURE — 250N000011 HC RX IP 250 OP 636: Mod: JZ | Performed by: ORTHOPAEDIC SURGERY

## 2023-07-05 PROCEDURE — 272N000001 HC OR GENERAL SUPPLY STERILE: Performed by: ORTHOPAEDIC SURGERY

## 2023-07-05 PROCEDURE — 250N000011 HC RX IP 250 OP 636: Mod: JZ | Performed by: ANESTHESIOLOGY

## 2023-07-05 PROCEDURE — 999N000141 HC STATISTIC PRE-PROCEDURE NURSING ASSESSMENT: Performed by: ORTHOPAEDIC SURGERY

## 2023-07-05 PROCEDURE — 250N000011 HC RX IP 250 OP 636: Mod: JZ | Performed by: NURSE ANESTHETIST, CERTIFIED REGISTERED

## 2023-07-05 PROCEDURE — 250N000009 HC RX 250: Performed by: ANESTHESIOLOGY

## 2023-07-05 RX ORDER — BUPIVACAINE HYDROCHLORIDE AND EPINEPHRINE 2.5; 5 MG/ML; UG/ML
INJECTION, SOLUTION EPIDURAL; INFILTRATION; INTRACAUDAL; PERINEURAL
Status: DISCONTINUED
Start: 2023-07-05 | End: 2023-07-05 | Stop reason: HOSPADM

## 2023-07-05 RX ORDER — HYDROCODONE BITARTRATE AND ACETAMINOPHEN 5; 325 MG/1; MG/1
2 TABLET ORAL
Status: DISCONTINUED | OUTPATIENT
Start: 2023-07-05 | End: 2023-07-05 | Stop reason: HOSPADM

## 2023-07-05 RX ORDER — KETOROLAC TROMETHAMINE 30 MG/ML
INJECTION, SOLUTION INTRAMUSCULAR; INTRAVENOUS PRN
Status: DISCONTINUED | OUTPATIENT
Start: 2023-07-05 | End: 2023-07-05

## 2023-07-05 RX ORDER — BUPIVACAINE HYDROCHLORIDE AND EPINEPHRINE 5; 5 MG/ML; UG/ML
INJECTION, SOLUTION PERINEURAL
Status: COMPLETED | OUTPATIENT
Start: 2023-07-05 | End: 2023-07-05

## 2023-07-05 RX ORDER — ONDANSETRON 2 MG/ML
4 INJECTION INTRAMUSCULAR; INTRAVENOUS EVERY 30 MIN PRN
Status: DISCONTINUED | OUTPATIENT
Start: 2023-07-05 | End: 2023-07-05 | Stop reason: HOSPADM

## 2023-07-05 RX ORDER — CEFAZOLIN SODIUM/WATER 3 G/30 ML
3 SYRINGE (ML) INTRAVENOUS SEE ADMIN INSTRUCTIONS
Status: DISCONTINUED | OUTPATIENT
Start: 2023-07-05 | End: 2023-07-05 | Stop reason: HOSPADM

## 2023-07-05 RX ORDER — SODIUM CHLORIDE, SODIUM LACTATE, POTASSIUM CHLORIDE, CALCIUM CHLORIDE 600; 310; 30; 20 MG/100ML; MG/100ML; MG/100ML; MG/100ML
INJECTION, SOLUTION INTRAVENOUS CONTINUOUS
Status: DISCONTINUED | OUTPATIENT
Start: 2023-07-05 | End: 2023-07-05 | Stop reason: HOSPADM

## 2023-07-05 RX ORDER — KETOROLAC TROMETHAMINE 10 MG/1
10 TABLET, FILM COATED ORAL
Qty: 15 TABLET | Refills: 0 | Status: SHIPPED | OUTPATIENT
Start: 2023-07-05

## 2023-07-05 RX ORDER — ONDANSETRON 2 MG/ML
INJECTION INTRAMUSCULAR; INTRAVENOUS PRN
Status: DISCONTINUED | OUTPATIENT
Start: 2023-07-05 | End: 2023-07-05

## 2023-07-05 RX ORDER — HYDROMORPHONE HCL IN WATER/PF 6 MG/30 ML
0.2 PATIENT CONTROLLED ANALGESIA SYRINGE INTRAVENOUS EVERY 5 MIN PRN
Status: DISCONTINUED | OUTPATIENT
Start: 2023-07-05 | End: 2023-07-05 | Stop reason: HOSPADM

## 2023-07-05 RX ORDER — HYDROXYZINE HYDROCHLORIDE 25 MG/1
25 TABLET, FILM COATED ORAL EVERY 6 HOURS PRN
Qty: 30 TABLET | Refills: 1 | Status: SHIPPED | OUTPATIENT
Start: 2023-07-05

## 2023-07-05 RX ORDER — MAGNESIUM SULFATE 4 G/50ML
4 INJECTION INTRAVENOUS ONCE
Status: COMPLETED | OUTPATIENT
Start: 2023-07-05 | End: 2023-07-05

## 2023-07-05 RX ORDER — ACETAMINOPHEN 325 MG/1
650 TABLET ORAL
Status: DISCONTINUED | OUTPATIENT
Start: 2023-07-05 | End: 2023-07-05 | Stop reason: HOSPADM

## 2023-07-05 RX ORDER — LIDOCAINE 40 MG/G
CREAM TOPICAL
Status: DISCONTINUED | OUTPATIENT
Start: 2023-07-05 | End: 2023-07-05 | Stop reason: HOSPADM

## 2023-07-05 RX ORDER — EPINEPHRINE 1 MG/ML
INJECTION, SOLUTION, CONCENTRATE INTRAVENOUS PRN
Status: DISCONTINUED | OUTPATIENT
Start: 2023-07-05 | End: 2023-07-05 | Stop reason: HOSPADM

## 2023-07-05 RX ORDER — HYDROMORPHONE HCL IN WATER/PF 6 MG/30 ML
0.4 PATIENT CONTROLLED ANALGESIA SYRINGE INTRAVENOUS EVERY 5 MIN PRN
Status: DISCONTINUED | OUTPATIENT
Start: 2023-07-05 | End: 2023-07-05 | Stop reason: HOSPADM

## 2023-07-05 RX ORDER — FENTANYL CITRATE 50 UG/ML
25 INJECTION, SOLUTION INTRAMUSCULAR; INTRAVENOUS EVERY 5 MIN PRN
Status: DISCONTINUED | OUTPATIENT
Start: 2023-07-05 | End: 2023-07-05 | Stop reason: HOSPADM

## 2023-07-05 RX ORDER — ACETAMINOPHEN 325 MG/1
650 TABLET ORAL EVERY 6 HOURS PRN
Qty: 50 TABLET | Refills: 0 | Status: SHIPPED | OUTPATIENT
Start: 2023-07-05

## 2023-07-05 RX ORDER — HYDROXYZINE HYDROCHLORIDE 25 MG/1
25 TABLET, FILM COATED ORAL
Status: DISCONTINUED | OUTPATIENT
Start: 2023-07-05 | End: 2023-07-05 | Stop reason: HOSPADM

## 2023-07-05 RX ORDER — BUPIVACAINE HYDROCHLORIDE AND EPINEPHRINE 2.5; 5 MG/ML; UG/ML
INJECTION, SOLUTION INFILTRATION; PERINEURAL PRN
Status: DISCONTINUED | OUTPATIENT
Start: 2023-07-05 | End: 2023-07-05 | Stop reason: HOSPADM

## 2023-07-05 RX ORDER — OXYCODONE HYDROCHLORIDE 5 MG/1
5-10 TABLET ORAL EVERY 4 HOURS PRN
Qty: 30 TABLET | Refills: 0 | Status: SHIPPED | OUTPATIENT
Start: 2023-07-05

## 2023-07-05 RX ORDER — ONDANSETRON 4 MG/1
4 TABLET, ORALLY DISINTEGRATING ORAL EVERY 30 MIN PRN
Status: DISCONTINUED | OUTPATIENT
Start: 2023-07-05 | End: 2023-07-05 | Stop reason: HOSPADM

## 2023-07-05 RX ORDER — HALOPERIDOL 5 MG/ML
1 INJECTION INTRAMUSCULAR ONCE
Status: COMPLETED | OUTPATIENT
Start: 2023-07-05 | End: 2023-07-05

## 2023-07-05 RX ORDER — FENTANYL CITRATE 50 UG/ML
25-100 INJECTION, SOLUTION INTRAMUSCULAR; INTRAVENOUS
Status: DISCONTINUED | OUTPATIENT
Start: 2023-07-05 | End: 2023-07-05 | Stop reason: HOSPADM

## 2023-07-05 RX ORDER — CEFAZOLIN SODIUM/WATER 3 G/30 ML
3 SYRINGE (ML) INTRAVENOUS
Status: COMPLETED | OUTPATIENT
Start: 2023-07-05 | End: 2023-07-05

## 2023-07-05 RX ORDER — DEXAMETHASONE SODIUM PHOSPHATE 10 MG/ML
INJECTION, SOLUTION INTRAMUSCULAR; INTRAVENOUS PRN
Status: DISCONTINUED | OUTPATIENT
Start: 2023-07-05 | End: 2023-07-05

## 2023-07-05 RX ORDER — PROPOFOL 10 MG/ML
INJECTION, EMULSION INTRAVENOUS PRN
Status: DISCONTINUED | OUTPATIENT
Start: 2023-07-05 | End: 2023-07-05

## 2023-07-05 RX ORDER — FENTANYL CITRATE 50 UG/ML
50 INJECTION, SOLUTION INTRAMUSCULAR; INTRAVENOUS EVERY 5 MIN PRN
Status: DISCONTINUED | OUTPATIENT
Start: 2023-07-05 | End: 2023-07-05 | Stop reason: HOSPADM

## 2023-07-05 RX ORDER — KETAMINE HYDROCHLORIDE 10 MG/ML
INJECTION INTRAMUSCULAR; INTRAVENOUS PRN
Status: DISCONTINUED | OUTPATIENT
Start: 2023-07-05 | End: 2023-07-05

## 2023-07-05 RX ADMIN — KETOROLAC TROMETHAMINE 30 MG: 30 INJECTION, SOLUTION INTRAMUSCULAR at 13:20

## 2023-07-05 RX ADMIN — KETAMINE HYDROCHLORIDE 10 MG: 10 INJECTION, SOLUTION INTRAMUSCULAR; INTRAVENOUS at 12:33

## 2023-07-05 RX ADMIN — KETAMINE HYDROCHLORIDE 10 MG: 10 INJECTION, SOLUTION INTRAMUSCULAR; INTRAVENOUS at 12:46

## 2023-07-05 RX ADMIN — FENTANYL CITRATE 50 MCG: 50 INJECTION, SOLUTION INTRAMUSCULAR; INTRAVENOUS at 11:57

## 2023-07-05 RX ADMIN — MAGNESIUM SULFATE HEPTAHYDRATE 4 G: 80 INJECTION, SOLUTION INTRAVENOUS at 10:47

## 2023-07-05 RX ADMIN — DEXAMETHASONE SODIUM PHOSPHATE 10 MG: 10 INJECTION, SOLUTION INTRAMUSCULAR; INTRAVENOUS at 12:20

## 2023-07-05 RX ADMIN — PROPOFOL 150 MG: 10 INJECTION, EMULSION INTRAVENOUS at 12:52

## 2023-07-05 RX ADMIN — LIDOCAINE HYDROCHLORIDE 40 MG: 10 INJECTION, SOLUTION INFILTRATION; PERINEURAL at 12:15

## 2023-07-05 RX ADMIN — ONDANSETRON 4 MG: 2 INJECTION INTRAMUSCULAR; INTRAVENOUS at 12:20

## 2023-07-05 RX ADMIN — FENTANYL CITRATE 50 MCG: 50 INJECTION, SOLUTION INTRAMUSCULAR; INTRAVENOUS at 12:25

## 2023-07-05 RX ADMIN — PROPOFOL 150 MCG/KG/MIN: 10 INJECTION, EMULSION INTRAVENOUS at 12:15

## 2023-07-05 RX ADMIN — HYDROMORPHONE HYDROCHLORIDE 0.5 MG: 1 INJECTION, SOLUTION INTRAMUSCULAR; INTRAVENOUS; SUBCUTANEOUS at 13:00

## 2023-07-05 RX ADMIN — HALOPERIDOL LACTATE 1 MG: 5 INJECTION, SOLUTION INTRAMUSCULAR at 15:11

## 2023-07-05 RX ADMIN — BUPIVACAINE HYDROCHLORIDE AND EPINEPHRINE BITARTRATE 15 ML: 5; .005 INJECTION, SOLUTION PERINEURAL at 11:57

## 2023-07-05 RX ADMIN — KETAMINE HYDROCHLORIDE 10 MG: 10 INJECTION, SOLUTION INTRAMUSCULAR; INTRAVENOUS at 12:24

## 2023-07-05 RX ADMIN — Medication 3 G: at 12:10

## 2023-07-05 RX ADMIN — FENTANYL CITRATE 50 MCG: 50 INJECTION, SOLUTION INTRAMUSCULAR; INTRAVENOUS at 12:20

## 2023-07-05 RX ADMIN — HYDROMORPHONE HYDROCHLORIDE 0.5 MG: 1 INJECTION, SOLUTION INTRAMUSCULAR; INTRAVENOUS; SUBCUTANEOUS at 13:05

## 2023-07-05 RX ADMIN — MIDAZOLAM 1 MG: 1 INJECTION INTRAMUSCULAR; INTRAVENOUS at 12:10

## 2023-07-05 RX ADMIN — Medication 100 MG: at 12:52

## 2023-07-05 RX ADMIN — MIDAZOLAM 1 MG: 1 INJECTION INTRAMUSCULAR; INTRAVENOUS at 12:25

## 2023-07-05 RX ADMIN — MIDAZOLAM 1 MG: 1 INJECTION INTRAMUSCULAR; INTRAVENOUS at 11:57

## 2023-07-05 RX ADMIN — ONDANSETRON 4 MG: 2 INJECTION INTRAMUSCULAR; INTRAVENOUS at 14:43

## 2023-07-05 RX ADMIN — SODIUM CHLORIDE, POTASSIUM CHLORIDE, SODIUM LACTATE AND CALCIUM CHLORIDE: 600; 310; 30; 20 INJECTION, SOLUTION INTRAVENOUS at 13:09

## 2023-07-05 RX ADMIN — SODIUM CHLORIDE, POTASSIUM CHLORIDE, SODIUM LACTATE AND CALCIUM CHLORIDE 1000 ML: 600; 310; 30; 20 INJECTION, SOLUTION INTRAVENOUS at 10:48

## 2023-07-05 RX ADMIN — KETAMINE HYDROCHLORIDE 20 MG: 10 INJECTION, SOLUTION INTRAMUSCULAR; INTRAVENOUS at 12:19

## 2023-07-05 ASSESSMENT — ACTIVITIES OF DAILY LIVING (ADL)
ADLS_ACUITY_SCORE: 35

## 2023-07-05 NOTE — ANESTHESIA POSTPROCEDURE EVALUATION
Patient: Khalida Lei    Procedure: Procedure(s):  RIGHT KNEE ARTHROSCOPIC PARTIAL MEDIAL MENISCECTOMY       Anesthesia Type:  General    Note:  Disposition: Outpatient   Postop Pain Control: Uneventful            Sign Out: Well controlled pain   PONV: No   Neuro/Psych: Uneventful            Sign Out: Acceptable/Baseline neuro status   Airway/Respiratory: Uneventful            Sign Out: Acceptable/Baseline resp. status   CV/Hemodynamics: Uneventful            Sign Out: Acceptable CV status; No obvious hypovolemia; No obvious fluid overload   Other NRE: NONE   DID A NON-ROUTINE EVENT OCCUR? No           Last vitals:  Vitals Value Taken Time   /62 07/05/23 1500   Temp 36.8  C (98.2  F) 07/05/23 1340   Pulse 69 07/05/23 1500   Resp 12 07/05/23 1500   SpO2 95 % 07/05/23 1459   Vitals shown include unvalidated device data.    Electronically Signed By: Tang Lowry MD  July 5, 2023  3:52 PM

## 2023-07-05 NOTE — OP NOTE
[facility] Brief Operative Note:    Pre-operative diagnosis: Acute medial meniscal tear, right, initial encounter [C30.296F]   Post-operative diagnosis Same   Procedure: Procedure(s):  RIGHT KNEE ARTHROSCOPIC PARTIAL MEDIAL MENISCECTOMY   Surgeon: Crow Zaragoza MD   Assistants(s): Alfonzo Blanchard PA-C   Estimated blood loss: 10 cc   Specimens: None   Findings: Medial meniscus tear

## 2023-07-05 NOTE — ANESTHESIA CARE TRANSFER NOTE
Patient: Khalida Lei    Procedure: Procedure(s):  RIGHT KNEE ARTHROSCOPIC PARTIAL MEDIAL MENISCECTOMY       Diagnosis: Acute medial meniscal tear, right, initial encounter [S85.263Q]  Diagnosis Additional Information: No value filed.    Anesthesia Type:   General     Note:    Oropharynx: spontaneously breathing and oropharynx clear of all foreign objects  Level of Consciousness: awake  Oxygen Supplementation: face mask  Level of Supplemental Oxygen (L/min / FiO2): 6  Independent Airway: airway patency satisfactory and stable  Dentition: dentition unchanged  Vital Signs Stable: post-procedure vital signs reviewed and stable    Patient transferred to: PACU    Handoff Report: Identifed the Patient, Identified the Reponsible Provider, Reviewed the pertinent medical history, Discussed the surgical course, Reviewed Intra-OP anesthesia mangement and issues during anesthesia, Set expectations for post-procedure period and Allowed opportunity for questions and acknowledgement of understanding      Vitals:  Vitals Value Taken Time   /59 07/05/23 1343   Temp     Pulse 78 07/05/23 1343   Resp 30 07/05/23 1343   SpO2 81 % 07/05/23 1343   Vitals shown include unvalidated device data.    Electronically Signed By: NEYMAR Neal CRNA  July 5, 2023  1:44 PM

## 2023-07-05 NOTE — PROVIDER NOTIFICATION
MDA notified patient extremely restless and non-cooperative waking up. MDA verbalized understanding and does not want patient to receive any more medications at this time. Writer verbalized understanding and will continue to monitor patient.

## 2023-07-05 NOTE — ANESTHESIA PROCEDURE NOTES
"Saphenous Procedure Note    Pre-Procedure   Staff -        Anesthesiologist:  Tang Lowry MD       Performed By: anesthesiologist       Location: pre-op       Procedure Start/Stop Times: 7/5/2023 11:57 AM and 7/5/2023 12:01 PM       Pre-Anesthestic Checklist: patient identified, IV checked, site marked, risks and benefits discussed, informed consent, monitors and equipment checked, pre-op evaluation, at physician/surgeon's request and post-op pain management  Timeout:       Correct Patient: Yes        Correct Procedure: Yes        Correct Site: Yes        Correct Position: Yes        Correct Laterality: Yes        Site Marked: Yes  Procedure Documentation  Procedure: Saphenous       Laterality: right       Patient Position: supine       Skin prep: Chloraprep       Needle Gauge: 20.        Needle Length (Inches): 4        Ultrasound guided       1. Ultrasound was used to identify targeted nerve, plexus, vascular marker, or fascial plane and place a needle adjacent to it in real-time.       2. Ultrasound was used to visualize the spread of anesthetic in close proximity to the above referenced structure.       3. A permanent image is entered into the patient's record.       4. The visualized anatomic structures appeared normal.       5. There were no apparent abnormal pathologic findings.    Assessment/Narrative         The placement was negative for: blood aspirated, painful injection and site bleeding       Paresthesias: No.       Bolus given via needle..        Secured via.        Insertion/Infusion Method: Single Shot       Complications: none    Medication(s) Administered   Bupivacaine 0.5% w/ 1:200K Epi (Other) - Other   15 mL - 7/5/2023 11:57:00 AM  Medication Administration Time: 7/5/2023 11:57 AM      FOR [facility] ([facility]) ONLY:   Pain Team Contact information: please page the Pain Team Via [employer]. Search \"Pain\". During daytime hours, please page the attending first. At night please page the resident " first.

## 2023-07-05 NOTE — PHARMACY-ADMISSION MEDICATION HISTORY
Pharmacist Admission Medication History    Admission medication history is complete. The information provided in this note is only as accurate as the sources available at the time of the update.    Medication reconciliation/reorder completed by provider prior to medication history? No    Information Source(s): Patient via in-person    Pertinent Information:      Changes made to PTA medication list:    Added: None    Deleted: None    Changed: None    Medication Affordability:  Not including over the counter (OTC) medications, was there a time in the past 3 months when you did not take your medications as prescribed because of cost?: No    Allergies reviewed with patient and updates made in EHR: yes    Medication History Completed By: Mo Alford RPH 7/5/2023 10:29 AM    Prior to Admission medications    Medication Sig Last Dose Taking? Auth Provider Long Term End Date   aspirin 81 MG EC tablet Take 81 mg by mouth daily Per Dr. Zaragoza, take for one week prior to surgery 7/4/2023 Yes Reported, Patient     calcium citrate-vitamin D (CITRACAL) 315-5 MG-MCG TABS per tablet Take 2 tablets by mouth 2 times daily 6/28/2023 Yes Reported, Patient     cetirizine (ZYRTEC) 10 MG tablet Take 10 mg by mouth daily as needed for allergies Past Month Yes Reported, Patient     cholecalciferol 125 MCG (5000 UT) CAPS Take 5,000 Units by mouth daily 6/28/2023 Yes Reported, Patient     multivitamin capsule Take 1 capsule by mouth daily 6/28/2023 Yes Provider, Historical

## 2023-07-05 NOTE — INTERVAL H&P NOTE
"I have reviewed the surgical (or preoperative) H&P that is linked to this encounter, and examined the patient. There are no significant changes.    Clinical Conditions Present on Arrival:  Clinically Significant Risk Factors Present on Admission                 # Drug Induced Platelet Defect: home medication list includes an antiplatelet medication  # Severe Obesity: Estimated body mass index is 50.98 kg/m  as calculated from the following:    Height as of this encounter: 1.62 m (5' 3.78\").    Weight as of this encounter: 133.8 kg (294 lb 15.6 oz).       "

## 2023-07-05 NOTE — ANESTHESIA PROCEDURE NOTES
Airway       Patient location during procedure: OR       Procedure Start/Stop Times: 7/5/2023 12:54 PM  Staff -        CRNA: Bakari Clayton APRN CRNA       Performed By: CRNA  Consent for Airway        Urgency: emergent  Indications and Patient Condition       Indications for airway management: sabrina-procedural       Induction type:RSI       Mask difficulty assessment: 1 - vent by mask    Final Airway Details       Final airway type: endotracheal airway       Successful airway: ETT - single  Endotracheal Airway Details        ETT size (mm): 7.0       Cuffed: yes       Cuff volume (mL): 10       Successful intubation technique: video laryngoscopy       VL Blade Size: Glidescope 3       Grade View of Cords: 1       Adjucts: stylet       Position: Right       Measured from: lips       Secured at (cm): 21       Bite block used: None    Post intubation assessment        Placement verified by: capnometry, equal breath sounds and chest rise        Number of attempts at approach: 1       Number of other approaches attempted: 0       Secured with: silk tape       Ease of procedure: easy       Dentition: Unchanged    Medication(s) Administered   Medication Administration Time: 7/5/2023 12:54 PM

## 2023-07-05 NOTE — ANESTHESIA PREPROCEDURE EVALUATION
Anesthesia Pre-Procedure Evaluation    Patient: Khalida Lei   MRN: [id number] : 1970        Procedure : Procedure(s):  RIGHT KNEE ARTHROSCOPIC PARTIAL MEDIAL MENISCECTOMY          Past Medical History:   Diagnosis Date     Motion sickness      PONV (postoperative nausea and vomiting)       Past Surgical History:   Procedure Laterality Date     APPENDECTOMY       COLONOSCOPY N/A 2021    Procedure: COLONOSCOPY WITH POLYPS;  Surgeon: Maggie Phillips MD;  Location: [facility] Main OR      Allergies   Allergen Reactions     Codeine Unknown     Scallops [Seafood] Unknown      Social History     Tobacco Use     Smoking status: Never     Smokeless tobacco: Never   Substance Use Topics     Alcohol use: Not Currently     Comment: Alcoholic Drinks/day: Occasionally      Wt Readings from Last 1 Encounters:   23 133.8 kg (294 lb 15.6 oz)        Anesthesia Evaluation   Pt has had prior anesthetic.     History of anesthetic complications  - PONV.      ROS/MED HX  ENT/Pulmonary:  - neg pulmonary ROS   (+) sleep apnea, doesn't use CPAP,     Neurologic:  - neg neurologic ROS     Cardiovascular:  - neg cardiovascular ROS     METS/Exercise Tolerance: >4 METS    Hematologic:  - neg hematologic  ROS     Musculoskeletal:  - neg musculoskeletal ROS     GI/Hepatic:  - neg GI/hepatic ROS     Renal/Genitourinary:  - neg Renal ROS     Endo:  - neg endo ROS   (+) Obesity,     Psychiatric/Substance Use:  - neg psychiatric ROS     Infectious Disease:  - neg infectious disease ROS     Malignancy:  - neg malignancy ROS     Other:  - neg other ROS          Physical Exam    Airway  airway exam normal      Mallampati: II   TM distance: > 3 FB   Neck ROM: full   Mouth opening: > 3 cm    Respiratory Devices and Support         Dental           Cardiovascular   cardiovascular exam normal       Rhythm and rate: regular     Pulmonary   pulmonary exam normal        breath sounds clear to auscultation           OUTSIDE  LABS:  CBC: No results found for: WBC, HGB, HCT, PLT  BMP: No results found for: NA, POTASSIUM, CHLORIDE, CO2, BUN, CR, GLC  COAGS: No results found for: PTT, INR, FIBR  POC: No results found for: BGM, HCG, HCGS  HEPATIC: No results found for: ALBUMIN, PROTTOTAL, ALT, AST, GGT, ALKPHOS, BILITOTAL, BILIDIRECT, SHARIF  OTHER:   Lab Results   Component Value Date    A1C 5.5 03/01/2017    TSH 3.22 06/10/2005       Anesthesia Plan    ASA Status:  3   NPO Status:  NPO Appropriate    Anesthesia Type: General.     - Airway: Mask Only   Induction: Intravenous.   Maintenance: TIVA.        Consents    Anesthesia Plan(s) and associated risks, benefits, and realistic alternatives discussed. Questions answered and patient/representative(s) expressed understanding.    - Discussed: Risks, Benefits and Alternatives for BOTH SEDATION and the PROCEDURE were discussed     - Discussed with:       - Extended Intubation/Ventilatory Support Discussed: No.         Postoperative Care    Pain management: IV analgesics, Oral pain medications, Peripheral nerve block (Single Shot), Multi-modal analgesia.   PONV prophylaxis: Ondansetron (or other 5HT-3), Dexamethasone or Solumedrol     Comments:    Other Comments:  Peripheral nerve block for post-op analgesia as ordered by surgeon.  Saphenous nerve block.            Tang Lowry MD

## 2023-07-05 NOTE — INTERVAL H&P NOTE
"I have reviewed the surgical (or preoperative) H&P that is linked to this encounter, and examined the patient. There are no significant changes    Clinical Conditions Present on Arrival:  Clinically Significant Risk Factors Present on Admission                 # Drug Induced Platelet Defect: home medication list includes an antiplatelet medication  # Severe Obesity: Estimated body mass index is 50.98 kg/m  as calculated from the following:    Height as of this encounter: 1.62 m (5' 3.78\").    Weight as of this encounter: 133.8 kg (294 lb 15.6 oz).       "

## 2023-07-06 NOTE — OP NOTE
DATE OF SERVICE:  7/5/23        PREOPERATIVE DIAGNOSIS:  1.  Right knee acute, posterior horn, medial meniscus tear.  2.  Patellofemoral and medial femoral condyle chondromalacia.     POSTOPERATIVE DIAGNOSIS:  1.  Right knee acute, posterior horn, medial meniscus tear.  2.  Patellofemoral and medial femoral condyle chondromalacia.        PROCEDURE:  1.  Right knee arthroscopic, partial medial meniscectomy.  2.  Patellofemoral and medial femoral condyle chondromalacia.        ATTENDING SURGEON:  Crow Zaragoza M.D.        FIRST ASSISTANT:  Alfonzo Blanchard PA-C.  ALBER assistant was required for patient positioning, instrumentation assist, maneuvering of the right lower extremity during the surgical procedure, closure and patient safety.     ANESTHESIA:  Local plus sedation.     ESTIMATED BLOOD LOSS:   10 cc     TOURNIQUET TIME:  None.     DRAINS:  None.     IMPLANTS:  None.     SPECIMENS:  None.        DESCRIPTION OF PROCEDURE:  Patient was brought back to the operating room and her right  knee was sterilely prepped and draped in standard fashion. At this time, 2 standard  anterior arthroscopic portals were made. Upon entering the suprapatellar pouch,  medial and lateral gutters, no loose bodies were noted. The undersurface of her patella  showed grade 2 chondromalacia, and broad based grade 2/3 chondromalacia was noted to the trochlea.  So a shaver was brought in to perform a patellofemoral chondroplasty.  Upon entering the lateral compartment, there was no lateral meniscus tear was noted and no lateral compartment chondromalacia was noted. Upon entering the notch, the ACL was intact.    Upon entering the medial compartment, there was an acute radial and horizontal cleavage tear to posterior  horn of the medial meniscus. An arthroscopic shaver and biter were utilized to perform an  arthroscopic partial medial meniscectomy, removing approximately 60% of the width of  the meniscus in the torn region to reach a stable base  of the meniscal tissue.   Careful evaluation of the medial femoral condyle broad based showed grade 3/4 chondromalacia to the weight bearing surface of the medial femoral condyle. So a shaver was brought in to perform a medial femoral condyle chondroplasty.  At this time, the entire knee was copiously irrigated and drained and portals were approximated with interrupted 3-0 nylon stitches. Sterile dressing was placed and the patient was transferred to the recovery room in stable condition.       POSTOPERATIVE PLAN:   Patient will ice and elevate her right knee, and she will work on ankle pumps, quad sets, straight leg raises  and she will be on enteric-coated aspirin 81 mg per day for the next 4 weeks for DVT prophylaxis and we will see the patient back in the office in the next 7 to 10 days.       LEIGHTON MAURICIO MD  [facility]  [phone]

## (undated) DEVICE — SOL WATER IRRIG 1000ML BOTTLE 2F7114

## (undated) DEVICE — GLOVE BIOGEL INDICATOR 7.5 LF 41675

## (undated) DEVICE — PREP CHLORAPREP 26ML TINTED HI-LITE ORANGE 930815

## (undated) DEVICE — SUCTION MANIFOLD NEPTUNE 2 SYS 1 PORT 702-025-000

## (undated) DEVICE — HOLDER LIMB VELCRO OR 0814-1533

## (undated) DEVICE — GLOVE SURG PI ULTRA TOUCH M SZ 8 LF

## (undated) DEVICE — FORCEP BIOPSY DISP 000386

## (undated) DEVICE — SOLUTION IRRIG 2B7127 .9NS 3000ML BAG

## (undated) DEVICE — GOWN IMPERVIOUS BREATHABLE SMART XLG 89045

## (undated) DEVICE — TUBING SUCTION MEDI-VAC 1/4"X20' N620A - HE

## (undated) DEVICE — SUCTION MANIFOLD NEPTUNE 2 SYS 4 PORT 0702-020-000

## (undated) DEVICE — CUSTOM PACK ARTHROSCOPY KNEE SOP5BAKHEA

## (undated) DEVICE — MAT FLOOR SURGICAL 40X38 0702140238

## (undated) DEVICE — BLADE KNIFE SURG 11 371111

## (undated) DEVICE — GLOVE BIOGEL PI INDICATOR 8.0 LF 41680

## (undated) DEVICE — TUBING SYSTEM ARTHREX PATIENT REDEUCE AR-6421

## (undated) DEVICE — SNARE OVAL SMALL DISP 100600

## (undated) DEVICE — BUR ARTHREX COOLCUT DBL CUT CVD 4.0MMX130MM AR-8400CDCS

## (undated) DEVICE — GLOVE BIOGEL PI ULTRATOUCH G SZ 7.5 42175

## (undated) DEVICE — SU ETHILON 3-0 PS-1 18" 1663G

## (undated) RX ORDER — FENTANYL CITRATE 50 UG/ML
INJECTION, SOLUTION INTRAMUSCULAR; INTRAVENOUS
Status: DISPENSED
Start: 2023-07-05

## (undated) RX ORDER — PROPOFOL 10 MG/ML
INJECTION, EMULSION INTRAVENOUS
Status: DISPENSED
Start: 2023-07-05

## (undated) RX ORDER — LIDOCAINE HYDROCHLORIDE 10 MG/ML
INJECTION, SOLUTION EPIDURAL; INFILTRATION; INTRACAUDAL; PERINEURAL
Status: DISPENSED
Start: 2023-07-05